# Patient Record
(demographics unavailable — no encounter records)

---

## 2024-10-08 NOTE — DEVELOPMENTAL MILESTONES
[Normal Development] : Normal Development [None] : none [Calms when picked up or spoken to] : calms when picked up or spoken to [Looks briefly at objects] : looks briefly at objects [Alerts to unexpected sound] : alerts to unexpected sound [Makes brief short vowel sounds] : makes brief short vowel sounds [Holds chin up in prone] : holds chin up in prone [Passed] : passed [FreeTextEntry2] : 9

## 2024-10-08 NOTE — REVIEW OF SYSTEMS
[Eye Discharge] : eye discharge [Spitting Up] : spitting up [Rash] : rash [Negative] : Genitourinary

## 2024-10-08 NOTE — BEGINNING OF VISIT
[Mother] : mother [] :  [[employer] ] : provided by [employer]   [Time Spent: ____ minutes] : Total time spent using  services: [unfilled] minutes. The patient's primary language is not English thus required  services. [Interpreters_IDNumber] : [id number] [Interpreters_FullName] : Lizeth Grewal [TWNoteComboBox1] : [place]

## 2024-10-08 NOTE — PHYSICAL EXAM
[Alert] : alert [Normocephalic] : normocephalic [Conjunctivae with no discharge] : conjunctivae with no discharge [PERRL] : PERRL [EOMI Bilateral] : EOMI bilateral [Red Reflex Bilateral] : red reflex bilateral [Normally Placed Ears] : normally placed ears [Auricles Well Formed] : auricles well formed [Nares Patent] : nares patent [Palate Intact] : palate intact [Uvula Midline] : uvula midline [Supple, full passive range of motion] : supple, full passive range of motion [Symmetric Chest Rise] : symmetric chest rise [Clear to Auscultation Bilaterally] : clear to auscultation bilaterally [Regular Rate and Rhythm] : regular rate and rhythm [S1, S2 present] : S1, S2 present [Soft] : soft [Normal external genitailia] : normal external genitalia [No Abnormal Lymph Nodes Palpated] : no abnormal lymph nodes palpated [Startle Reflex] : startle reflex present [Suck Reflex] : suck reflex present [Rooting] : rooting reflex present [Plantar Grasp] : plantar grasp reflex present [Symmetric Arianna] : symmetric [place] [Acute Distress] : no acute distress [Discharge] : no discharge [Tender] : nontender [Distended] : not distended [Buitrago-Ortolani] : negative Buitrago-Ortolani [Jaundice] : no jaundice [FreeTextEntry5] : no discharge or conjunctival redness [de-identified] : +  acne on face

## 2024-10-08 NOTE — HISTORY OF PRESENT ILLNESS
[Mother] : mother [___ voids per day] : [unfilled] voids per day [Green/brown] : green/brown [Bloody] : bloody [In Bassinet/Crib] : sleeps in bassinet/crib [Loose bedding, pillow, toys, and/or bumpers in crib] : loose bedding, pillow, toys, and/or bumpers in crib [No] : No cigarette smoke exposure [Rear facing car seat in back seat] : Rear facing car seat in back seat [Carbon Monoxide Detectors] : Carbon monoxide detectors at home [Smoke Detectors] : Smoke detectors at home. [Vitamins ___] : no vitamins [Co-sleeping] : no co-sleeping [FreeTextEntry7] : 1 month old F with MPA here for WCC. Was seen at another pediatrician for  visit. Has not received hep B and mom does not want her to receive it today. Had admission at [facility] [place] for bloody diarrhea - diagnosed with milk's protein allergy. Met with nutrition and has been on Alimentum as mom does an elimination diet. Since discharge, still have bloody diarrhea, but mom says it has lessened. [de-identified] : Feeding breast milk and Alimentum. 2 ounces at a time with some spit up. Feeding every 3 hours. [FreeTextEntry8] : Some bloody stool, but improving and turning green in color [FreeTextEntry9] : minimal tummy time, recommended to continue  [FreeTextEntry1] : Ex-41 weeks born 2357 on 24 via  APGARS 4 and 9. BW 3290 g. Prenatal labs negative. Ata negative. Was admitted to the NICU for risk of sepsis 2/2 maternal chorioamnionitis. Did not require culture or antibiotics. Required PPV and then transitioned to CPAP. Did not receive hepatitis B vaccine and mom declines today. States that it is a "sexually transmitted disease" so she is not concerned. Discussed the importance of vaccines as well as other routes of transmission.

## 2024-10-08 NOTE — DISCUSSION/SUMMARY
[Normal Growth] : growth [Normal Development] : development  [No Elimination Concerns] : elimination [Continue Regimen] : feeding [No Skin Concerns] : skin [Normal Sleep Pattern] : sleep [None] : no medical problems [Anticipatory Guidance Given] : Anticipatory guidance addressed as per the history of present illness section [Parental Well-Being] : parental well-being [Family Adjustment] : family adjustment [Feeding Routines] : feeding routines [Infant Adjustment] : infant adjustment [Safety] : safety [No Medications] : ~He/She~ is not on any medications [Parent/Guardian] : Parent/Guardian [FreeTextEntry1] : 1 month F with MPA here for WCC. No growth, elimination, or developmental concerns. Patient diagnosed with MPA at [facility] [place] - currently improving bloody stool on Alimentum and elimination diet. Following recommendations of nutrition. Mom declines vaccines at this time. Information provided regarding importance of vaccines and encouraged mom to do more research prior to 2 month visit as that is the start of childhood vaccine series. Answered all questions and encouraged her to call with additional questions. Patient with  acne - no concerns at this time. Mom also noted eye discharge at home but no fever or eye redness - will continue to monitor. Vitamin D sent to pharmacy. No record of G6PD on file, will send at 9 months. NBS unremarkable.   - Continue ad marcello feeds, return for feeding intolerance - Continue monitoring elimination, minimum 4 voids per 24hrs - Continue safe sleep practice including back-to-sleep - Encouraged tummy time to improve head control - Advised appropriate car seat placement - Reviewed anticipatory guidance regarding fever - No vaccines given today - Parents counseled to call if rectal temperature >100.4 degrees  - Return to clinic in 1 month for routine 2 month WCC, or sooner with other concerns

## 2024-11-15 NOTE — DISCUSSION/SUMMARY
[FreeTextEntry1] : Growing well, meeting milestones, normal exam. Had extended conversation regarding vaccinations per below:  - Vital signs reviewed and normal - Routine age appropriate bright futures topics discussed, including but not limited to the topics below - Reviewed feeding, continue every 2 to 4 hours. Do not need to wake baby to feed once regained birth weight - Delay solids until 4 months of age - Give Vitamin D 400 IU once daily if breast feeding - Advise putting baby down when drowsy to prepare for sleeping training (on average babies sleep train around 4 months) - Continue supervised tummy time and providing stimulating environment with toys, avoid screen time - Reviewed safe sleep and sudden infant death syndrome risk. Place baby on back to sleep in own crib without extra bedding. STOP swaddling once you witness baby has rolled over (any orientation) or at age 4 months - Use rear facing car seat in the back of the car when travelling in a motor vehicle as this is the safest position for the baby - Monitor for signs of fever, check rectal temperature if baby is hard to wake up, having persistent trouble feeding, or warm. If >100.4F or less than 97F, must go to an emergency department for evaluation - Safety measures: never leave baby alone in tub/high place/alone, and do not shake baby - Call office for any concerning symptoms or parental concerns. - AAP Bright Futures handout provided today - Follow up for 4 month well child check  Refusal to vaccinate: - Discussed studied, evidence based benefit of vaccines and known side effect profiles - Discussed the risks of not vaccinating, including serious and harmful infections that can be life threatening and in severe cases cause death - Reviewed prolonged fever precautions (all fevers require same day visits) if infants are under vaccinated - Call office for any fevers due to risk of serious bacterial infection  - Parents expressed understanding of risks - Encouraged parents they can discuss the benefits of vaccines and risks of not vaccinating with us to come up with a patient centered health plan  - Reviewed Health system policy for vaccination, if family declines vaccines, will need to refer to another pediatrician's office, can return for care for next 30 days, family signed AAP refusal to vaccinate form (did not have [place] form available in [place])  - Family considering alternate schedule, would prefer to not give Hepatitis B until older - I reviewed can make vaccine visit next week and get Pentacel, PCV20, and Rotavirus, but will eventually need to get Hepatitis B vaccination as it is a required vaccine on the CDC schedule - Family expressed understanding   During today's visit, services included coordinating care, counseling and education patient/family/caregiver, documenting clinical information in the electronic health record, reviewed medical records and data, total time spent on the E/M service before/during/after visit on date of service: 35 minutes

## 2024-11-15 NOTE — PHYSICAL EXAM
[Alert] : alert [Acute Distress] : no acute distress [Normocephalic] : normocephalic [Flat Open Anterior [place]] : flat open anterior fontanelle [PERRL] : PERRL [Red Reflex Bilateral] : red reflex bilateral [Normally Placed Ears] : normally placed ears [Auricles Well Formed] : auricles well formed [Clear Tympanic membranes] : clear tympanic membranes [Light reflex present] : light reflex present [Bony landmarks visible] : bony landmarks visible [Discharge] : no discharge [Nares Patent] : nares patent [Palate Intact] : palate intact [Uvula Midline] : uvula midline [Supple, full passive range of motion] : supple, full passive range of motion [Palpable Masses] : no palpable masses [Symmetric Chest Rise] : symmetric chest rise [Clear to Auscultation Bilaterally] : clear to auscultation bilaterally [Regular Rate and Rhythm] : regular rate and rhythm [S1, S2 present] : S1, S2 present [Murmurs] : no murmurs [+2 Femoral Pulses] : +2 femoral pulses [Soft] : soft [Tender] : nontender [Distended] : not distended [Bowel Sounds] : bowel sounds present [Hepatomegaly] : no hepatomegaly [Splenomegaly] : no splenomegaly [Normal external genitailia] : normal external genitalia [Clitoromegaly] : no clitoromegaly [Patent Vagina] : vagina patent [Normally Placed] : normally placed [No Abnormal Lymph Nodes Palpated] : no abnormal lymph nodes palpated [Buitrago-Ortolani] : negative Buitrago-Ortolani [Symmetric Flexed Extremities] : symmetric flexed extremities [Spinal Dimple] : no spinal dimple [Tuft of Hair] : no tuft of hair [Startle Reflex] : startle reflex present [Suck Reflex] : suck reflex present [Rooting] : rooting reflex present [Palmar Grasp] : palmar grasp reflex present [Plantar Grasp] : plantar grasp reflex present [Symmetric Arianna] : symmetric [place] [Rash and/or lesion present] : no rash/lesion

## 2024-11-15 NOTE — HISTORY OF PRESENT ILLNESS
[FreeTextEntry1] : Here with Mom and Dad for 2 month visit Visit conducted today with video : [place], [id number] [place]  Questions about dry skin Breast feeding well Spit ups NBNB Infant sleeps in bassinet/crib, on back, firm mattress, and without additional loose items. No co-sleeping. Car seat is rear facing in back of the car Family declines vaccines today due to Latter-day beliefs